# Patient Record
Sex: MALE | Race: WHITE | HISPANIC OR LATINO | URBAN - METROPOLITAN AREA
[De-identification: names, ages, dates, MRNs, and addresses within clinical notes are randomized per-mention and may not be internally consistent; named-entity substitution may affect disease eponyms.]

---

## 2023-04-06 ENCOUNTER — WEB ENCOUNTER (OUTPATIENT)
Dept: URBAN - METROPOLITAN AREA CLINIC 7 | Facility: CLINIC | Age: 38
End: 2023-04-06

## 2023-04-07 ENCOUNTER — OFFICE VISIT (OUTPATIENT)
Dept: URBAN - METROPOLITAN AREA CLINIC 7 | Facility: CLINIC | Age: 38
End: 2023-04-07
Payer: COMMERCIAL

## 2023-04-07 ENCOUNTER — DASHBOARD ENCOUNTERS (OUTPATIENT)
Age: 38
End: 2023-04-07

## 2023-04-07 ENCOUNTER — WEB ENCOUNTER (OUTPATIENT)
Dept: URBAN - METROPOLITAN AREA CLINIC 7 | Facility: CLINIC | Age: 38
End: 2023-04-07

## 2023-04-07 VITALS
BODY MASS INDEX: 34.53 KG/M2 | HEIGHT: 67 IN | DIASTOLIC BLOOD PRESSURE: 78 MMHG | SYSTOLIC BLOOD PRESSURE: 126 MMHG | WEIGHT: 220 LBS | TEMPERATURE: 97.8 F

## 2023-04-07 DIAGNOSIS — K80.20 GALLSTONES: ICD-10-CM

## 2023-04-07 DIAGNOSIS — R10.11 RUQ PAIN: ICD-10-CM

## 2023-04-07 PROBLEM — 235919008: Status: ACTIVE | Noted: 2023-04-07

## 2023-04-07 PROCEDURE — 99204 OFFICE O/P NEW MOD 45 MIN: CPT | Performed by: STUDENT IN AN ORGANIZED HEALTH CARE EDUCATION/TRAINING PROGRAM

## 2023-04-07 PROCEDURE — 99244 OFF/OP CNSLTJ NEW/EST MOD 40: CPT | Performed by: STUDENT IN AN ORGANIZED HEALTH CARE EDUCATION/TRAINING PROGRAM

## 2023-04-07 RX ORDER — PANTOPRAZOLE SODIUM 20 MG/1
1 TABLET TABLET, DELAYED RELEASE ORAL ONCE A DAY
Status: ACTIVE | COMMUNITY

## 2023-04-07 RX ORDER — PANTOPRAZOLE SODIUM 20 MG/1
1 TABLET TABLET, DELAYED RELEASE ORAL ONCE A DAY
OUTPATIENT

## 2023-04-07 RX ORDER — OMEPRAZOLE 20 MG/1
1 CAPSULE 30 MINUTES BEFORE A MEAL CAPSULE, DELAYED RELEASE ORAL ONCE A DAY
Qty: 30 | Refills: 3 | OUTPATIENT
Start: 2023-04-07

## 2023-04-07 NOTE — PHYSICAL EXAM GASTROINTESTINAL
Soft, mild discomfort in RUQ/epigastric area, nondistended, no guarding or rigidity, normal bowel sounds

## 2023-04-07 NOTE — HPI-TODAY'S VISIT:
Patient has a history of testicular cancer s/p R testicle resection in 2020, who presents for gallstones  GI Hx: 2-4 weeks of RUQ and epigastric pain, related to fried foods. 5-10 min after eating. Cramping in nature. Episodic. Now sx are worse, lasting longer. Recently 2 weeks ago, had severe abd pain at 2am, unrelenting. Went to ER. In ER 2 weeks ago they referred him to someone in [place] (GI/surgery?), but unable to get in to their office anytime soon.  Denies weight loss, fever, chills, vomiting, diarrhea.  Denies dysphagia. Denies hematemesis, melena, hematochezia, blood per rectum.  EGD: None  Colonoscopy: None  Imaging/Studies/Procedures: CT 2 weeks ago at [facility] in ER showing gallstones? No records available

## 2023-04-18 ENCOUNTER — TELEPHONE ENCOUNTER (OUTPATIENT)
Dept: URBAN - METROPOLITAN AREA CLINIC 8 | Facility: CLINIC | Age: 38
End: 2023-04-18

## 2023-04-20 ENCOUNTER — TELEPHONE ENCOUNTER (OUTPATIENT)
Dept: URBAN - METROPOLITAN AREA CLINIC 7 | Facility: CLINIC | Age: 38
End: 2023-04-20

## 2023-04-27 ENCOUNTER — OFFICE VISIT (OUTPATIENT)
Dept: URBAN - METROPOLITAN AREA CLINIC 7 | Facility: CLINIC | Age: 38
End: 2023-04-27